# Patient Record
Sex: FEMALE | Race: WHITE | NOT HISPANIC OR LATINO | URBAN - METROPOLITAN AREA
[De-identification: names, ages, dates, MRNs, and addresses within clinical notes are randomized per-mention and may not be internally consistent; named-entity substitution may affect disease eponyms.]

---

## 2020-10-16 ENCOUNTER — OFFICE VISIT (OUTPATIENT)
Dept: URBAN - METROPOLITAN AREA CLINIC 19 | Facility: CLINIC | Age: 80
End: 2020-10-16
Payer: MEDICARE

## 2020-10-16 ENCOUNTER — WEB ENCOUNTER (OUTPATIENT)
Dept: URBAN - METROPOLITAN AREA CLINIC 19 | Facility: CLINIC | Age: 80
End: 2020-10-16

## 2020-10-16 DIAGNOSIS — K21.9 GASTROESOPHAGEAL REFLUX DISEASE WITHOUT ESOPHAGITIS: ICD-10-CM

## 2020-10-16 DIAGNOSIS — K44.9 HIATAL HERNIA: ICD-10-CM

## 2020-10-16 DIAGNOSIS — K29.30 CHRONIC SUPERFICIAL GASTRITIS WITHOUT BLEEDING: ICD-10-CM

## 2020-10-16 PROBLEM — 196735001: Status: ACTIVE | Noted: 2020-10-16

## 2020-10-16 PROBLEM — 84089009: Status: ACTIVE | Noted: 2020-10-16

## 2020-10-16 PROCEDURE — G8427 DOCREV CUR MEDS BY ELIG CLIN: HCPCS | Performed by: INTERNAL MEDICINE

## 2020-10-16 PROCEDURE — G8484 FLU IMMUNIZE NO ADMIN: HCPCS | Performed by: INTERNAL MEDICINE

## 2020-10-16 PROCEDURE — G8420 CALC BMI NORM PARAMETERS: HCPCS | Performed by: INTERNAL MEDICINE

## 2020-10-16 PROCEDURE — G9903 PT SCRN TBCO ID AS NON USER: HCPCS | Performed by: INTERNAL MEDICINE

## 2020-10-16 PROCEDURE — 99213 OFFICE O/P EST LOW 20 MIN: CPT | Performed by: INTERNAL MEDICINE

## 2020-10-16 RX ORDER — ATORVASTATIN CALCIUM 40 MG/1
TAKE 1 TABLET (40 MG) BY ORAL ROUTE ONCE DAILY TABLET, FILM COATED ORAL 1
Qty: 0 | Refills: 0 | Status: ACTIVE | COMMUNITY
Start: 1900-01-01

## 2020-10-16 RX ORDER — CYCLOSPORINE 0.5 MG/ML
EMULSION OPHTHALMIC
Qty: 0 | Refills: 0 | Status: ACTIVE | COMMUNITY
Start: 1900-01-01

## 2020-10-16 RX ORDER — OMEPRAZOLE 20 MG/1
1 CAPSULE 30 MINUTES BEFORE MORNING MEAL CAPSULE, DELAYED RELEASE ORAL ONCE A DAY
Qty: 90 | Refills: 3 | OUTPATIENT
Start: 2020-10-16

## 2020-10-16 RX ORDER — CANDESARTAN CILEXETIL 32 MG/1
TAKE 1 TABLET (32 MG) BY ORAL ROUTE ONCE DAILY TABLET ORAL 1
Qty: 0 | Refills: 0 | Status: ACTIVE | COMMUNITY
Start: 1900-01-01

## 2020-10-16 NOTE — HPI-TODAY'S VISIT:
The patient is a 78 year old /White female, who presents on referral from Thanh Cedeño MD, for a gastroenterology evaluation for abdominal pain. A copy of this document will be sent to the referring provider.  Patient presents for evaluation of severe epigastric pain that occurred a few weeks ago (mid-July) over a weekend. The pain was spontaneous and radiated to her back. When she went to her PCP, he believed it to be GI in origin and added omeprazole 20 mg po daily to her daily regimen of ranitidine 150 mg po BID. She felt better. Prior EGD in 2010 showed severe gastritis (negative for H. pylori). Prior colonoscopy in 2010 was unremarkable - due for screening next year. Patient reported that she has intermittent dyspnea going up stairs with some fluctuations in BP. That has improved, but she has not seen a cardiologist in the past.  10/16/20:  Patient presents to discuss a chronic cough.  She has not taken the omeprazole for months, although she still takes a famotidine at night.  She stopped the ranitidine due to concerns about cancer.  She states that she can just start cough uncontrollably almost every day.  She went to an ENT, who did not feel that it was sinus drainage or other cause from their standpoint.  She has no heartburn.

## 2020-11-05 ENCOUNTER — LAB OUTSIDE AN ENCOUNTER (OUTPATIENT)
Dept: URBAN - METROPOLITAN AREA CLINIC 19 | Facility: CLINIC | Age: 80
End: 2020-11-05

## 2020-12-18 ENCOUNTER — OFFICE VISIT (OUTPATIENT)
Dept: URBAN - METROPOLITAN AREA CLINIC 19 | Facility: CLINIC | Age: 80
End: 2020-12-18

## 2021-02-12 ENCOUNTER — OFFICE VISIT (OUTPATIENT)
Dept: URBAN - METROPOLITAN AREA CLINIC 128 | Facility: CLINIC | Age: 81
End: 2021-02-12

## 2021-02-15 ENCOUNTER — OFFICE VISIT (OUTPATIENT)
Dept: URBAN - METROPOLITAN AREA CLINIC 19 | Facility: CLINIC | Age: 81
End: 2021-02-15
Payer: MEDICARE

## 2021-02-15 DIAGNOSIS — K21.9 GERD (GASTROESOPHAGEAL REFLUX DISEASE): ICD-10-CM

## 2021-02-15 DIAGNOSIS — R05 COUGH: ICD-10-CM

## 2021-02-15 PROCEDURE — G8420 CALC BMI NORM PARAMETERS: HCPCS | Performed by: INTERNAL MEDICINE

## 2021-02-15 PROCEDURE — 99214 OFFICE O/P EST MOD 30 MIN: CPT | Performed by: INTERNAL MEDICINE

## 2021-02-15 PROCEDURE — G8427 DOCREV CUR MEDS BY ELIG CLIN: HCPCS | Performed by: INTERNAL MEDICINE

## 2021-02-15 PROCEDURE — G8482 FLU IMMUNIZE ORDER/ADMIN: HCPCS | Performed by: INTERNAL MEDICINE

## 2021-02-15 RX ORDER — OMEPRAZOLE 20 MG/1
1 CAPSULE 30 MINUTES BEFORE MORNING MEAL CAPSULE, DELAYED RELEASE ORAL ONCE A DAY
Qty: 90 | Refills: 3 | Status: ACTIVE | COMMUNITY
Start: 2020-10-16

## 2021-02-15 RX ORDER — SUCRALFATE 1 G/1
1 TABLET ON AN EMPTY STOMACH TABLET ORAL
Qty: 120 TABLET | Refills: 2 | OUTPATIENT
Start: 2021-02-15 | End: 2021-05-16

## 2021-02-15 RX ORDER — ATORVASTATIN CALCIUM 40 MG/1
TAKE 1 TABLET (40 MG) BY ORAL ROUTE ONCE DAILY TABLET, FILM COATED ORAL 1
Qty: 0 | Refills: 0 | Status: ACTIVE | COMMUNITY
Start: 1900-01-01

## 2021-02-15 RX ORDER — CANDESARTAN CILEXETIL 32 MG/1
TAKE 1 TABLET (32 MG) BY ORAL ROUTE ONCE DAILY TABLET ORAL 1
Qty: 0 | Refills: 0 | Status: ACTIVE | COMMUNITY
Start: 1900-01-01

## 2021-02-15 RX ORDER — CYCLOSPORINE 0.5 MG/ML
EMULSION OPHTHALMIC
Qty: 0 | Refills: 0 | Status: ACTIVE | COMMUNITY
Start: 1900-01-01

## 2021-02-15 RX ORDER — PANTOPRAZOLE SODIUM 40 MG/1
1 TABLET TABLET, DELAYED RELEASE ORAL TWICE A DAY
Qty: 180 TABLET | Refills: 2 | OUTPATIENT
Start: 2021-02-15

## 2021-02-15 NOTE — HPI-TODAY'S VISIT:
is here for followup. She has prviously seen my partner  for epigastric pain and reflux.  EGD in Aug 2019 showed moderate hiatal hernia and mild gastritis but biopsies were normal.  Last colonoscopy 2010- no polyps. 02/15/2020- Developed recurrent coughing in summer 2020. Saw ENT and had laryngoscopy- looked normal.  Was told she could have reflux. She then saw  and was prescribed Omeprazole which it did not work. She had a Barium esophagogram in Nov 2020 which showed a small intermittent hiatal hernia and spontanoues reflux to mid esophagus. She continues to have recurrent coughing.

## 2021-04-13 ENCOUNTER — ERX REFILL RESPONSE (OUTPATIENT)
Dept: URBAN - METROPOLITAN AREA CLINIC 19 | Facility: CLINIC | Age: 81
End: 2021-04-13

## 2021-04-13 RX ORDER — SUCRALFATE 1 G/1
1 TABLET ON AN EMPTY STOMACH TABLET ORAL
Qty: 120 | Refills: 2 | OUTPATIENT

## 2021-04-13 RX ORDER — SUCRALFATE 1 G/1
1 TABLET ON AN EMPTY STOMACH FOUR TIMES DAILY ORALLY 30 DAY(S) TABLET ORAL
Qty: 360 TABLET | Refills: 1 | OUTPATIENT

## 2021-05-17 ENCOUNTER — OFFICE VISIT (OUTPATIENT)
Dept: URBAN - METROPOLITAN AREA CLINIC 19 | Facility: CLINIC | Age: 81
End: 2021-05-17
Payer: MEDICARE

## 2021-05-17 DIAGNOSIS — R05 CHRONIC COUGH: ICD-10-CM

## 2021-05-17 DIAGNOSIS — K21.9 GASTROESOPHAGEAL REFLUX DISEASE, UNSPECIFIED WHETHER ESOPHAGITIS PRESENT: ICD-10-CM

## 2021-05-17 PROCEDURE — 99212 OFFICE O/P EST SF 10 MIN: CPT | Performed by: INTERNAL MEDICINE

## 2021-05-17 RX ORDER — CYCLOSPORINE 0.5 MG/ML
EMULSION OPHTHALMIC
Qty: 0 | Refills: 0 | Status: ACTIVE | COMMUNITY
Start: 1900-01-01

## 2021-05-17 RX ORDER — PANTOPRAZOLE SODIUM 40 MG/1
1 TABLET TABLET, DELAYED RELEASE ORAL TWICE A DAY
Qty: 180 TABLET | Refills: 2 | Status: ACTIVE | COMMUNITY
Start: 2021-02-15

## 2021-05-17 RX ORDER — CANDESARTAN CILEXETIL 32 MG/1
TAKE 1 TABLET (32 MG) BY ORAL ROUTE ONCE DAILY TABLET ORAL 1
Qty: 0 | Refills: 0 | Status: ACTIVE | COMMUNITY
Start: 1900-01-01

## 2021-05-17 RX ORDER — OMEPRAZOLE 20 MG/1
1 CAPSULE 30 MINUTES BEFORE MORNING MEAL CAPSULE, DELAYED RELEASE ORAL ONCE A DAY
Qty: 90 | Refills: 3 | Status: DISCONTINUED | COMMUNITY
Start: 2020-10-16

## 2021-05-17 RX ORDER — SUCRALFATE 1 G/1
1 TABLET ON AN EMPTY STOMACH FOUR TIMES DAILY ORALLY 30 DAY(S) TABLET ORAL
Qty: 360 TABLET | Refills: 1 | Status: DISCONTINUED | COMMUNITY

## 2021-05-17 RX ORDER — ATORVASTATIN CALCIUM 40 MG/1
TAKE 1 TABLET (40 MG) BY ORAL ROUTE ONCE DAILY TABLET, FILM COATED ORAL 1
Qty: 0 | Refills: 0 | Status: ACTIVE | COMMUNITY
Start: 1900-01-01

## 2021-12-13 ENCOUNTER — ERX REFILL RESPONSE (OUTPATIENT)
Dept: URBAN - METROPOLITAN AREA CLINIC 19 | Facility: CLINIC | Age: 81
End: 2021-12-13

## 2021-12-13 RX ORDER — PANTOPRAZOLE SODIUM 40 MG/1
1 TABLET TABLET, DELAYED RELEASE ORAL TWICE A DAY
Qty: 180 TABLET | Refills: 2 | OUTPATIENT

## 2021-12-13 RX ORDER — PANTOPRAZOLE SODIUM 40 MG/1
TAKE 1 TABLET BY MOUTH TWICE A DAY TABLET, DELAYED RELEASE ORAL
Qty: 180 TABLET | Refills: 3 | OUTPATIENT

## 2022-02-22 ENCOUNTER — OFFICE VISIT (OUTPATIENT)
Dept: URBAN - METROPOLITAN AREA CLINIC 19 | Facility: CLINIC | Age: 82
End: 2022-02-22
Payer: MEDICARE

## 2022-02-22 VITALS
HEIGHT: 63 IN | HEART RATE: 99 BPM | TEMPERATURE: 98.5 F | BODY MASS INDEX: 23.04 KG/M2 | WEIGHT: 130 LBS | OXYGEN SATURATION: 97 % | SYSTOLIC BLOOD PRESSURE: 130 MMHG | DIASTOLIC BLOOD PRESSURE: 80 MMHG

## 2022-02-22 DIAGNOSIS — K21.9 GASTROESOPHAGEAL REFLUX DISEASE, UNSPECIFIED WHETHER ESOPHAGITIS PRESENT: ICD-10-CM

## 2022-02-22 PROCEDURE — 99213 OFFICE O/P EST LOW 20 MIN: CPT | Performed by: NURSE PRACTITIONER

## 2022-02-22 RX ORDER — PANTOPRAZOLE SODIUM 40 MG/1
TAKE 1 TABLET BY MOUTH TWICE A DAY TABLET, DELAYED RELEASE ORAL
Qty: 180 TABLET | Refills: 3 | Status: ACTIVE | COMMUNITY

## 2022-02-22 RX ORDER — ATORVASTATIN CALCIUM 40 MG/1
TAKE 1 TABLET (40 MG) BY ORAL ROUTE ONCE DAILY TABLET, FILM COATED ORAL 1
Qty: 0 | Refills: 0 | Status: DISCONTINUED | COMMUNITY
Start: 1900-01-01

## 2022-02-22 RX ORDER — CANDESARTAN CILEXETIL 32 MG/1
TAKE 1 TABLET (32 MG) BY ORAL ROUTE ONCE DAILY TABLET ORAL 1
Qty: 0 | Refills: 0 | Status: ACTIVE | COMMUNITY
Start: 1900-01-01

## 2022-02-22 RX ORDER — CYCLOSPORINE 0.5 MG/ML
EMULSION OPHTHALMIC
Qty: 0 | Refills: 0 | Status: DISCONTINUED | COMMUNITY
Start: 1900-01-01

## 2022-02-22 NOTE — HPI-TODAY'S VISIT:
5/17/21 Dr. Rollins:  is here for 3 month followup. She had GERD flare which was causing cough . She also had sinus drainage and  started her on anti histamines which have helped.  She was also referred to Dr. Garcia who has started her on tessalon lozenges and counseled her on managing chronic cough which she feels have definitely helped.  2/22/22: Ms. Mckeon is a 81-year-old female who presents today for worsening GERD and chronic cough x 1month.  She reports she has been taking pantoprazole 40 mg daily since she was last seen by Dr. Rollins in May.  She reports recent episodes of regurgitation.  She needed to take an additional Pepcid on those days.  She also reported some epigastric pain at that time.  She reports her chronic cough is back and she needs to keep clearing her throat. She reports she Sucralfate improved her symptoms previously.

## 2022-03-14 ENCOUNTER — OFFICE VISIT (OUTPATIENT)
Dept: URBAN - METROPOLITAN AREA CLINIC 19 | Facility: CLINIC | Age: 82
End: 2022-03-14
Payer: MEDICARE

## 2022-03-14 VITALS
DIASTOLIC BLOOD PRESSURE: 83 MMHG | TEMPERATURE: 97.1 F | HEART RATE: 85 BPM | WEIGHT: 125.6 LBS | OXYGEN SATURATION: 95 % | SYSTOLIC BLOOD PRESSURE: 160 MMHG | HEIGHT: 63 IN | BODY MASS INDEX: 22.25 KG/M2

## 2022-03-14 DIAGNOSIS — K21.9 GERD WITHOUT ESOPHAGITIS: ICD-10-CM

## 2022-03-14 PROCEDURE — 99213 OFFICE O/P EST LOW 20 MIN: CPT | Performed by: INTERNAL MEDICINE

## 2022-03-14 RX ORDER — PANTOPRAZOLE SODIUM 40 MG/1
TAKE 1 TABLET BY MOUTH TWICE A DAY TABLET, DELAYED RELEASE ORAL
Qty: 180 TABLET | Refills: 3 | Status: ACTIVE | COMMUNITY

## 2022-03-14 RX ORDER — CANDESARTAN CILEXETIL 32 MG/1
TAKE 1 TABLET (32 MG) BY ORAL ROUTE ONCE DAILY TABLET ORAL 1
Qty: 0 | Refills: 0 | Status: ACTIVE | COMMUNITY
Start: 1900-01-01

## 2022-03-14 NOTE — HPI-TODAY'S VISIT:
5/17/21 Dr. Rollins:  is here for 3 month followup. She had GERD flare which was causing cough . She also had sinus drainage and  started her on anti histamines which have helped.  She was also referred to Dr. Garcia who has started her on tessalon lozenges and counseled her on managing chronic cough which she feels have definitely helped.  2/22/22: Ms. Mckeon is a 81-year-old female who presents today for worsening GERD and chronic cough x 1month.  She reports she has been taking pantoprazole 40 mg daily since she was last seen by Dr. Rollins in May.  She reports recent episodes of regurgitation.  She needed to take an additional Pepcid on those days.  She also reported some epigastric pain at that time.  She reports her chronic cough is back and she needs to keep clearing her throat. She reports she Sucralfate improved her symptoms previously.  3/14/2022  She is here for followup. She is feeling better. Sucralfate seems to have helped. Cough is gone.

## 2022-03-14 NOTE — PHYSICAL EXAM EYES:
Conjuntivae and eyelids appear normal,  Sclerae : White without injection [FreeTextEntry1] : New Pt\par routine exam\par \par 43 yo BF  had 1 C/S   has nl menses\par \par \par no abn findings on exam\par \par pap obtained\par \par discussed preconception planning if pt does plan to get pregnant\par \par return in one yr

## 2022-07-15 ENCOUNTER — ERX REFILL RESPONSE (OUTPATIENT)
Dept: URBAN - METROPOLITAN AREA CLINIC 19 | Facility: CLINIC | Age: 82
End: 2022-07-15

## 2022-07-15 RX ORDER — PANTOPRAZOLE SODIUM 40 MG/1
TAKE 1 TABLET BY MOUTH TWICE A DAY TABLET, DELAYED RELEASE ORAL
Qty: 180 TABLET | Refills: 3 | OUTPATIENT

## 2022-07-15 RX ORDER — PANTOPRAZOLE SODIUM 40 MG/1
TAKE 1 TABLET BY MOUTH TWICE A DAY TABLET, DELAYED RELEASE ORAL
Qty: 180 TABLET | Refills: 2 | OUTPATIENT

## 2022-08-03 ENCOUNTER — OFFICE VISIT (OUTPATIENT)
Dept: URBAN - METROPOLITAN AREA CLINIC 19 | Facility: CLINIC | Age: 82
End: 2022-08-03
Payer: MEDICARE

## 2022-08-03 VITALS
DIASTOLIC BLOOD PRESSURE: 71 MMHG | HEART RATE: 91 BPM | TEMPERATURE: 98.7 F | HEIGHT: 63 IN | WEIGHT: 123 LBS | BODY MASS INDEX: 21.79 KG/M2 | SYSTOLIC BLOOD PRESSURE: 159 MMHG

## 2022-08-03 DIAGNOSIS — R19.4 CHANGE IN BOWEL HABITS: ICD-10-CM

## 2022-08-03 DIAGNOSIS — K21.9 GASTROESOPHAGEAL REFLUX DISEASE, UNSPECIFIED WHETHER ESOPHAGITIS PRESENT: ICD-10-CM

## 2022-08-03 PROCEDURE — 99213 OFFICE O/P EST LOW 20 MIN: CPT | Performed by: INTERNAL MEDICINE

## 2022-08-03 RX ORDER — PANTOPRAZOLE SODIUM 40 MG/1
TAKE 1 TABLET BY MOUTH TWICE A DAY TABLET, DELAYED RELEASE ORAL
Qty: 180 TABLET | Refills: 2 | Status: ACTIVE | COMMUNITY

## 2022-08-03 RX ORDER — CANDESARTAN CILEXETIL 32 MG/1
TAKE 1 TABLET (32 MG) BY ORAL ROUTE ONCE DAILY TABLET ORAL 1
Qty: 0 | Refills: 0 | Status: ACTIVE | COMMUNITY
Start: 1900-01-01

## 2022-08-03 NOTE — HPI-TODAY'S VISIT:
5/17/21 Dr. Rollins:  is here for 3 month followup. She had GERD flare which was causing cough . She also had sinus drainage and  started her on anti histamines which have helped.  She was also referred to Dr. Garcia who has started her on tessalon lozenges and counseled her on managing chronic cough which she feels have definitely helped.  2/22/22: Ms. Mckeon is a 81-year-old female who presents today for worsening GERD and chronic cough x 1month.  She reports she has been taking pantoprazole 40 mg daily since she was last seen by Dr. Rollins in May.  She reports recent episodes of regurgitation.  She needed to take an additional Pepcid on those days.  She also reported some epigastric pain at that time.  She reports her chronic cough is back and she needs to keep clearing her throat. She reports she Sucralfate improved her symptoms previously.  3/14/2022  She is here for followup. She is feeling better. Sucralfate seems to have helped. Cough is gone.  8/3/2022- She had a severe bout of acute onset diarrhea with cramping that started 2 weeks ago . One week ago things slowly returned to normal .  Pantoprazole 40 mg/d a day keeps her reflux under control. No cough.

## 2022-09-12 ENCOUNTER — OFFICE VISIT (OUTPATIENT)
Dept: URBAN - METROPOLITAN AREA CLINIC 19 | Facility: CLINIC | Age: 82
End: 2022-09-12

## 2022-09-20 ENCOUNTER — OFFICE VISIT (OUTPATIENT)
Dept: URBAN - METROPOLITAN AREA CLINIC 19 | Facility: CLINIC | Age: 82
End: 2022-09-20

## 2023-02-07 ENCOUNTER — OFFICE VISIT (OUTPATIENT)
Dept: URBAN - METROPOLITAN AREA CLINIC 19 | Facility: CLINIC | Age: 83
End: 2023-02-07

## 2023-03-07 ENCOUNTER — OFFICE VISIT (OUTPATIENT)
Dept: URBAN - METROPOLITAN AREA CLINIC 19 | Facility: CLINIC | Age: 83
End: 2023-03-07

## 2023-03-15 ENCOUNTER — OFFICE VISIT (OUTPATIENT)
Dept: URBAN - METROPOLITAN AREA CLINIC 19 | Facility: CLINIC | Age: 83
End: 2023-03-15

## 2023-09-27 ENCOUNTER — ERX REFILL RESPONSE (OUTPATIENT)
Dept: URBAN - METROPOLITAN AREA CLINIC 19 | Facility: CLINIC | Age: 83
End: 2023-09-27

## 2023-09-27 RX ORDER — PANTOPRAZOLE SODIUM 40 MG/1
TAKE 1 TABLET BY MOUTH TWICE A DAY TABLET, DELAYED RELEASE ORAL
Qty: 180 TABLET | Refills: 2 | OUTPATIENT

## 2024-01-05 ENCOUNTER — DASHBOARD ENCOUNTERS (OUTPATIENT)
Age: 84
End: 2024-01-05

## 2024-01-05 ENCOUNTER — OFFICE VISIT (OUTPATIENT)
Dept: URBAN - METROPOLITAN AREA CLINIC 128 | Facility: CLINIC | Age: 84
End: 2024-01-05
Payer: MEDICARE

## 2024-01-05 VITALS
SYSTOLIC BLOOD PRESSURE: 126 MMHG | WEIGHT: 128 LBS | DIASTOLIC BLOOD PRESSURE: 78 MMHG | BODY MASS INDEX: 22.68 KG/M2 | HEIGHT: 63 IN

## 2024-01-05 DIAGNOSIS — K21.9 CHRONIC GERD: ICD-10-CM

## 2024-01-05 PROBLEM — 235595009: Status: ACTIVE | Noted: 2024-01-05

## 2024-01-05 PROCEDURE — 99212 OFFICE O/P EST SF 10 MIN: CPT | Performed by: INTERNAL MEDICINE

## 2024-01-05 RX ORDER — CANDESARTAN CILEXETIL 32 MG/1
TAKE 1 TABLET (32 MG) BY ORAL ROUTE ONCE DAILY TABLET ORAL 1
Qty: 0 | Refills: 0 | COMMUNITY
Start: 1900-01-01

## 2024-01-05 RX ORDER — PANTOPRAZOLE SODIUM 40 MG/1
TAKE 1 TABLET BY MOUTH TWICE A DAY TABLET, DELAYED RELEASE ORAL
Qty: 180 TABLET | Refills: 2 | COMMUNITY

## 2024-01-05 RX ORDER — PANTOPRAZOLE SODIUM 40 MG/1
1 TABLET TABLET, DELAYED RELEASE ORAL ONCE A DAY
Qty: 90 TABLET | Refills: 3 | OUTPATIENT
Start: 2024-01-05

## 2024-01-05 NOTE — HPI-TODAY'S VISIT:
5/17/21 Dr. Rollins:  is here for 3 month followup. She had GERD flare which was causing cough . She also had sinus drainage and  started her on anti histamines which have helped.  She was also referred to Dr. Garcia who has started her on tessalon lozenges and counseled her on managing chronic cough which she feels have definitely helped.  2/22/22: Ms. Mckeon is a 81-year-old female who presents today for worsening GERD and chronic cough x 1month.  She reports she has been taking pantoprazole 40 mg daily since she was last seen by Dr. Rollins in May.  She reports recent episodes of regurgitation.  She needed to take an additional Pepcid on those days.  She also reported some epigastric pain at that time.  She reports her chronic cough is back and she needs to keep clearing her throat. She reports she Sucralfate improved her symptoms previously.  3/14/2022  She is here for followup. She is feeling better. Sucralfate seems to have helped. Cough is gone.  8/3/2022- She had a severe bout of acute onset diarrhea with cramping that started 2 weeks ago . One week ago things slowly returned to normal .  Pantoprazole 40 mg/d a day keeps her reflux under control. No cough.  1/5/24 Here for followup of GERd and diarrhea. Diarrhea has resolved ever since she cut back on coffee intake. GERD under very good control on pantoprazole 40 mg/d. No other symptoms.

## 2024-12-18 ENCOUNTER — TELEPHONE ENCOUNTER (OUTPATIENT)
Dept: URBAN - METROPOLITAN AREA CLINIC 128 | Facility: CLINIC | Age: 84
End: 2024-12-18

## 2024-12-18 RX ORDER — PANTOPRAZOLE SODIUM 40 MG/1
1 TABLET TABLET, DELAYED RELEASE ORAL ONCE A DAY
Qty: 90 TABLET | Refills: 3
Start: 2024-01-05

## 2025-03-03 ENCOUNTER — OFFICE VISIT (OUTPATIENT)
Dept: URBAN - METROPOLITAN AREA CLINIC 19 | Facility: CLINIC | Age: 85
End: 2025-03-03
Payer: MEDICARE

## 2025-03-03 VITALS
OXYGEN SATURATION: 100 % | WEIGHT: 126 LBS | BODY MASS INDEX: 22.32 KG/M2 | HEART RATE: 89 BPM | TEMPERATURE: 98.3 F | DIASTOLIC BLOOD PRESSURE: 69 MMHG | SYSTOLIC BLOOD PRESSURE: 122 MMHG | HEIGHT: 63 IN

## 2025-03-03 DIAGNOSIS — K21.9 CHRONIC GERD: ICD-10-CM

## 2025-03-03 DIAGNOSIS — R19.4 CHANGE IN BOWEL HABITS: ICD-10-CM

## 2025-03-03 PROCEDURE — 99214 OFFICE O/P EST MOD 30 MIN: CPT | Performed by: INTERNAL MEDICINE

## 2025-03-03 RX ORDER — PANTOPRAZOLE SODIUM 40 MG/1
TAKE 1 TABLET BY MOUTH TWICE A DAY TABLET, DELAYED RELEASE ORAL ONCE A DAY
Qty: 90 TABLET | Refills: 3

## 2025-03-03 RX ORDER — CANDESARTAN CILEXETIL 32 MG/1
TAKE 1 TABLET (32 MG) BY ORAL ROUTE ONCE DAILY TABLET ORAL 1
Qty: 0 | Refills: 0 | Status: ACTIVE | COMMUNITY
Start: 1900-01-01

## 2025-03-03 RX ORDER — ROSUVASTATIN CALCIUM 20 MG/1
1 TABLET TABLET, COATED ORAL ONCE A DAY
Status: ACTIVE | COMMUNITY

## 2025-03-03 RX ORDER — PANTOPRAZOLE SODIUM 40 MG/1
TAKE 1 TABLET BY MOUTH TWICE A DAY TABLET, DELAYED RELEASE ORAL
Qty: 180 TABLET | Refills: 2 | Status: ACTIVE | COMMUNITY

## 2025-03-03 RX ORDER — PANTOPRAZOLE SODIUM 40 MG/1
1 TABLET TABLET, DELAYED RELEASE ORAL ONCE A DAY
Qty: 90 TABLET | Refills: 3 | COMMUNITY
Start: 2024-01-05

## 2025-03-03 NOTE — HPI-TODAY'S VISIT:
5/17/21 Dr. Rollins:  is here for 3 month followup. She had GERD flare which was causing cough . She also had sinus drainage and  started her on anti histamines which have helped.  She was also referred to Dr. Garcia who has started her on tessalon lozenges and counseled her on managing chronic cough which she feels have definitely helped.  2/22/22: Ms. Mckeon is a 81-year-old female who presents today for worsening GERD and chronic cough x 1month.  She reports she has been taking pantoprazole 40 mg daily since she was last seen by Dr. Rollins in May.  She reports recent episodes of regurgitation.  She needed to take an additional Pepcid on those days.  She also reported some epigastric pain at that time.  She reports her chronic cough is back and she needs to keep clearing her throat. She reports she Sucralfate improved her symptoms previously.  3/14/2022  She is here for followup. She is feeling better. Sucralfate seems to have helped. Cough is gone.  8/3/2022- She had a severe bout of acute onset diarrhea with cramping that started 2 weeks ago . One week ago things slowly returned to normal .  Pantoprazole 40 mg/d a day keeps her reflux under control. No cough.  1/5/24 Here for followup of GERd and diarrhea. Diarrhea has resolved ever since she cut back on coffee intake. GERD under very good control on pantoprazole 40 mg/d. No other symptoms.  3/3/25 Here for annual folloup of GERD. Doing well on pantoprazole 40 mg/d. Now reports a normal formed stools followed bya few loose stools with urgency - happenns periodically affects quality of life. No constipation.

## 2025-06-06 ENCOUNTER — TELEPHONE ENCOUNTER (OUTPATIENT)
Dept: URBAN - METROPOLITAN AREA CLINIC 19 | Facility: CLINIC | Age: 85
End: 2025-06-06

## 2025-06-06 RX ORDER — PANTOPRAZOLE SODIUM 20 MG/1
1 TABLET 1/2 TO 1 HOUR BEFORE MORNING MEAL TABLET, DELAYED RELEASE ORAL ONCE A DAY
Qty: 90 TABLET | Refills: 3 | OUTPATIENT
Start: 2025-06-11